# Patient Record
Sex: MALE | Race: OTHER | NOT HISPANIC OR LATINO | URBAN - METROPOLITAN AREA
[De-identification: names, ages, dates, MRNs, and addresses within clinical notes are randomized per-mention and may not be internally consistent; named-entity substitution may affect disease eponyms.]

---

## 2023-05-26 ENCOUNTER — EMERGENCY (EMERGENCY)
Facility: HOSPITAL | Age: 43
LOS: 1 days | Discharge: HOME | End: 2023-05-26
Attending: EMERGENCY MEDICINE
Payer: MEDICAID

## 2023-05-26 ENCOUNTER — APPOINTMENT (OUTPATIENT)
Dept: AFTER HOURS CARE | Facility: EMERGENCY ROOM | Age: 43
End: 2023-05-26
Payer: MEDICAID

## 2023-05-26 VITALS
SYSTOLIC BLOOD PRESSURE: 137 MMHG | HEIGHT: 65 IN | DIASTOLIC BLOOD PRESSURE: 94 MMHG | HEART RATE: 87 BPM | RESPIRATION RATE: 16 BRPM | OXYGEN SATURATION: 100 % | WEIGHT: 182.1 LBS | TEMPERATURE: 98 F

## 2023-05-26 VITALS
HEART RATE: 83 BPM | TEMPERATURE: 98 F | RESPIRATION RATE: 17 BRPM | DIASTOLIC BLOOD PRESSURE: 78 MMHG | OXYGEN SATURATION: 98 % | SYSTOLIC BLOOD PRESSURE: 114 MMHG

## 2023-05-26 PROBLEM — Z00.00 ENCOUNTER FOR PREVENTIVE HEALTH EXAMINATION: Status: ACTIVE | Noted: 2023-05-26

## 2023-05-26 LAB
ALBUMIN SERPL ELPH-MCNC: 3.8 G/DL — SIGNIFICANT CHANGE UP (ref 3.5–5)
ALP SERPL-CCNC: 106 U/L — SIGNIFICANT CHANGE UP (ref 40–120)
ALT FLD-CCNC: 47 U/L DA — SIGNIFICANT CHANGE UP (ref 10–60)
ANION GAP SERPL CALC-SCNC: 1 MMOL/L — LOW (ref 5–17)
APPEARANCE UR: CLEAR — SIGNIFICANT CHANGE UP
AST SERPL-CCNC: 29 U/L — SIGNIFICANT CHANGE UP (ref 10–40)
BASOPHILS # BLD AUTO: 0.03 K/UL — SIGNIFICANT CHANGE UP (ref 0–0.2)
BASOPHILS NFR BLD AUTO: 0.3 % — SIGNIFICANT CHANGE UP (ref 0–2)
BILIRUB SERPL-MCNC: 0.8 MG/DL — SIGNIFICANT CHANGE UP (ref 0.2–1.2)
BILIRUB UR-MCNC: NEGATIVE — SIGNIFICANT CHANGE UP
BUN SERPL-MCNC: 10 MG/DL — SIGNIFICANT CHANGE UP (ref 7–18)
CALCIUM SERPL-MCNC: 9.5 MG/DL — SIGNIFICANT CHANGE UP (ref 8.4–10.5)
CHLORIDE SERPL-SCNC: 107 MMOL/L — SIGNIFICANT CHANGE UP (ref 96–108)
CO2 SERPL-SCNC: 27 MMOL/L — SIGNIFICANT CHANGE UP (ref 22–31)
COLOR SPEC: YELLOW — SIGNIFICANT CHANGE UP
CREAT SERPL-MCNC: 0.6 MG/DL — SIGNIFICANT CHANGE UP (ref 0.5–1.3)
DIFF PNL FLD: NEGATIVE — SIGNIFICANT CHANGE UP
EGFR: 122 ML/MIN/1.73M2 — SIGNIFICANT CHANGE UP
EOSINOPHIL # BLD AUTO: 0.02 K/UL — SIGNIFICANT CHANGE UP (ref 0–0.5)
EOSINOPHIL NFR BLD AUTO: 0.2 % — SIGNIFICANT CHANGE UP (ref 0–6)
GLUCOSE SERPL-MCNC: 143 MG/DL — HIGH (ref 70–99)
GLUCOSE UR QL: NEGATIVE — SIGNIFICANT CHANGE UP
HCT VFR BLD CALC: 42.4 % — SIGNIFICANT CHANGE UP (ref 39–50)
HGB BLD-MCNC: 14.3 G/DL — SIGNIFICANT CHANGE UP (ref 13–17)
IMM GRANULOCYTES NFR BLD AUTO: 0.3 % — SIGNIFICANT CHANGE UP (ref 0–0.9)
KETONES UR-MCNC: ABNORMAL
LEUKOCYTE ESTERASE UR-ACNC: ABNORMAL
LYMPHOCYTES # BLD AUTO: 1.29 K/UL — SIGNIFICANT CHANGE UP (ref 1–3.3)
LYMPHOCYTES # BLD AUTO: 12.7 % — LOW (ref 13–44)
MAGNESIUM SERPL-MCNC: 2.1 MG/DL — SIGNIFICANT CHANGE UP (ref 1.6–2.6)
MCHC RBC-ENTMCNC: 29.5 PG — SIGNIFICANT CHANGE UP (ref 27–34)
MCHC RBC-ENTMCNC: 33.7 GM/DL — SIGNIFICANT CHANGE UP (ref 32–36)
MCV RBC AUTO: 87.4 FL — SIGNIFICANT CHANGE UP (ref 80–100)
MONOCYTES # BLD AUTO: 0.54 K/UL — SIGNIFICANT CHANGE UP (ref 0–0.9)
MONOCYTES NFR BLD AUTO: 5.3 % — SIGNIFICANT CHANGE UP (ref 2–14)
NEUTROPHILS # BLD AUTO: 8.23 K/UL — HIGH (ref 1.8–7.4)
NEUTROPHILS NFR BLD AUTO: 81.2 % — HIGH (ref 43–77)
NITRITE UR-MCNC: NEGATIVE — SIGNIFICANT CHANGE UP
NRBC # BLD: 0 /100 WBCS — SIGNIFICANT CHANGE UP (ref 0–0)
PH UR: 6.5 — SIGNIFICANT CHANGE UP (ref 5–8)
PLATELET # BLD AUTO: 214 K/UL — SIGNIFICANT CHANGE UP (ref 150–400)
POTASSIUM SERPL-MCNC: 4.3 MMOL/L — SIGNIFICANT CHANGE UP (ref 3.5–5.3)
POTASSIUM SERPL-SCNC: 4.3 MMOL/L — SIGNIFICANT CHANGE UP (ref 3.5–5.3)
PROT SERPL-MCNC: 7.8 G/DL — SIGNIFICANT CHANGE UP (ref 6–8.3)
PROT UR-MCNC: 15 MG/DL
RBC # BLD: 4.85 M/UL — SIGNIFICANT CHANGE UP (ref 4.2–5.8)
RBC # FLD: 12.8 % — SIGNIFICANT CHANGE UP (ref 10.3–14.5)
SODIUM SERPL-SCNC: 135 MMOL/L — SIGNIFICANT CHANGE UP (ref 135–145)
SP GR SPEC: 1 — LOW (ref 1.01–1.02)
TROPONIN I, HIGH SENSITIVITY RESULT: 3.9 NG/L — SIGNIFICANT CHANGE UP
UROBILINOGEN FLD QL: NEGATIVE — SIGNIFICANT CHANGE UP
WBC # BLD: 10.14 K/UL — SIGNIFICANT CHANGE UP (ref 3.8–10.5)
WBC # FLD AUTO: 10.14 K/UL — SIGNIFICANT CHANGE UP (ref 3.8–10.5)

## 2023-05-26 PROCEDURE — 85025 COMPLETE CBC W/AUTO DIFF WBC: CPT

## 2023-05-26 PROCEDURE — 80053 COMPREHEN METABOLIC PANEL: CPT

## 2023-05-26 PROCEDURE — 83735 ASSAY OF MAGNESIUM: CPT

## 2023-05-26 PROCEDURE — 84484 ASSAY OF TROPONIN QUANT: CPT

## 2023-05-26 PROCEDURE — 81001 URINALYSIS AUTO W/SCOPE: CPT

## 2023-05-26 PROCEDURE — 99285 EMERGENCY DEPT VISIT HI MDM: CPT | Mod: 25

## 2023-05-26 PROCEDURE — 36415 COLL VENOUS BLD VENIPUNCTURE: CPT

## 2023-05-26 PROCEDURE — 93005 ELECTROCARDIOGRAM TRACING: CPT

## 2023-05-26 PROCEDURE — 71046 X-RAY EXAM CHEST 2 VIEWS: CPT

## 2023-05-26 PROCEDURE — 99204 OFFICE O/P NEW MOD 45 MIN: CPT | Mod: 95

## 2023-05-26 PROCEDURE — 99285 EMERGENCY DEPT VISIT HI MDM: CPT

## 2023-05-26 PROCEDURE — 71046 X-RAY EXAM CHEST 2 VIEWS: CPT | Mod: 26

## 2023-05-26 RX ORDER — MECLIZINE HCL 12.5 MG
1 TABLET ORAL
Qty: 15 | Refills: 0
Start: 2023-05-26

## 2023-05-26 RX ORDER — SODIUM CHLORIDE 9 MG/ML
1000 INJECTION INTRAMUSCULAR; INTRAVENOUS; SUBCUTANEOUS ONCE
Refills: 0 | Status: COMPLETED | OUTPATIENT
Start: 2023-05-26 | End: 2023-05-26

## 2023-05-26 RX ORDER — MECLIZINE HCL 12.5 MG
25 TABLET ORAL ONCE
Refills: 0 | Status: COMPLETED | OUTPATIENT
Start: 2023-05-26 | End: 2023-05-26

## 2023-05-26 RX ADMIN — SODIUM CHLORIDE 1000 MILLILITER(S): 9 INJECTION INTRAMUSCULAR; INTRAVENOUS; SUBCUTANEOUS at 16:19

## 2023-05-26 RX ADMIN — Medication 25 MILLIGRAM(S): at 16:19

## 2023-05-26 NOTE — ED PROVIDER NOTE - PATIENT PORTAL LINK FT
You can access the FollowMyHealth Patient Portal offered by [facility] by registering at the following website: [email or web address]. By joining [facility] FollowMyHealth portal, you will also be able to view your health information using other applications (apps) compatible with our system.

## 2023-05-26 NOTE — ED ADULT NURSE NOTE - PRIMARY CARE PROVIDER
Pt's mother reports Pt did \"back to back clean out on Thursday\"  Stool softener, 64 oz gatorade and 16 scoops miralax Thursday night and then 11am Friday    Pt started passing stool 15 minutes after starting prep on Thursday, has continued to have loose stool with water  Eating applesauce, yogurt, saltines, honey nut cheerios  Last 24 hours has stooled 4 times, having hunger pains  Denies fever or chills, abdominal cramping    Advised to push fluids, bland diet    Pt's mom requesting school excuse for today  Please fax to [facility] at [phone]    Ok for school excuse?          unkn

## 2023-05-26 NOTE — ED PROVIDER NOTE - CLINICAL SUMMARY MEDICAL DECISION MAKING FREE TEXT BOX
41 yo M with palpitations - non specific  Also with vague dizziness - suspect peripheral vertigo as worse with walking  EKG - nsr, rate 61, QTc 420, no ischemic changes, no ectopy   labs non actionable  UA - possible UTI, but pt denies any urinary symptoms; I do not suspect a 41 yo M would present with dizziness in setting of UTI w/ this UA, will not treat at this time  Dc supportive care out pt fu  Discussed indications for patient return to ED. Patient understood. 41 yo M with palpitations - non specific  Also with vague dizziness - suspect peripheral vertigo as worse with walking  EKG - nsr, rate 83, Qtc 418, no ischemic changes, no ectopy   labs non actionable  UA - possible UTI, but pt denies any urinary symptoms; I do not suspect a 41 yo M would present with dizziness in setting of UTI w/ this UA, will not treat at this time  Dc supportive care out pt fu  Discussed indications for patient return to ED. Patient understood.

## 2023-05-26 NOTE — ED PROVIDER NOTE - NSFOLLOWUPCLINICS_GEN_ALL_ED_FT
[facility]  Cardiology  [address] [facility] [address], [address]  [place] [address]  Phone: [phone]  Fax:     [facility]  Neurology  [address] [place] [address]  Phone: [phone]  Fax: [phone]

## 2023-05-26 NOTE — ED PROVIDER NOTE - PHYSICAL EXAMINATION
GENERAL: well appearing, no acute distress   HEAD: atraumatic   EYES: EOMI   ENT: moist oral mucosa   CARDIAC: regular rate, ext warm well pefused  RESPIRATORY: no increased work of breathing, lungs clear   MUSCULOSKELETAL: no deformity   NEUROLOGICAL: AAOx3, CN's II-XII intact, strength 5/5 bilateral UE and LE, sensation intact to light touch, finger to nose intact, steady gait  SKIN: no visible rash  PSYCHIATRIC: cooperative

## 2023-05-26 NOTE — ED PROVIDER NOTE - OBJECTIVE STATEMENT
43 yo M pmh of DM  c/o head feeling funny x a few days, worse with walking, kind of feels dizzy  With episode of palpitations today  Denies other acute complaints 43 yo M pmh of DM  c/o head feeling funny x a few days, worse with walking, kind of feels dizzy  With episode of palpitations today  Denies other acute complaints  Speaks English

## 2023-05-26 NOTE — ED PROVIDER NOTE - PRO INTERPRETER NEED 2
[place]
Marked improvement today with retreat of reactive cellulitis in right upper thigh. There is residual tender induration with erythema at the site of previous attempted I&D. Patient describes tremendous recent stress and dietary indiscretion. Evaluation remarkable for obesity and poorly controlled new diabetes with Hgb A1C = 13.0.  Patient has Staph aureus (MRSA v MSSA)  purulent cellulitis gradually responding to antibiotics.    Suggest;  Endocrine evaluation/glycemic control  D/C Zosyn  Ancef/Vanco  If sustained improvement: Resume Bactrim DS one tab BID for additional 7 days.

## 2023-05-26 NOTE — ED ADULT NURSE NOTE - NSFALLUNIVINTERV_ED_ALL_ED
Bed/Stretcher in lowest position, wheels locked, appropriate side rails in place/Call bell, personal items and telephone in reach/Instruct patient to call for assistance before getting out of bed/chair/stretcher/Non-slip footwear applied when patient is off stretcher/[place] to call system/Physically safe environment - no spills, clutter or unnecessary equipment/Purposeful proactive rounding/Room/bathroom lighting operational, light cord in reach

## 2023-06-02 ENCOUNTER — INPATIENT (INPATIENT)
Facility: HOSPITAL | Age: 43
LOS: 1 days | Discharge: HOME | DRG: 310 | End: 2023-06-04
Attending: STUDENT IN AN ORGANIZED HEALTH CARE EDUCATION/TRAINING PROGRAM | Admitting: STUDENT IN AN ORGANIZED HEALTH CARE EDUCATION/TRAINING PROGRAM
Payer: MEDICAID

## 2023-06-02 VITALS
RESPIRATION RATE: 19 BRPM | HEART RATE: 90 BPM | TEMPERATURE: 99 F | WEIGHT: 179.9 LBS | OXYGEN SATURATION: 95 % | HEIGHT: 65 IN | SYSTOLIC BLOOD PRESSURE: 144 MMHG | DIASTOLIC BLOOD PRESSURE: 81 MMHG

## 2023-06-02 DIAGNOSIS — E11.9 TYPE 2 DIABETES MELLITUS WITHOUT COMPLICATIONS: ICD-10-CM

## 2023-06-02 DIAGNOSIS — R42 DIZZINESS AND GIDDINESS: ICD-10-CM

## 2023-06-02 DIAGNOSIS — R00.2 PALPITATIONS: ICD-10-CM

## 2023-06-02 DIAGNOSIS — Z29.9 ENCOUNTER FOR PROPHYLACTIC MEASURES, UNSPECIFIED: ICD-10-CM

## 2023-06-02 DIAGNOSIS — R55 SYNCOPE AND COLLAPSE: ICD-10-CM

## 2023-06-02 LAB
ALBUMIN SERPL ELPH-MCNC: 3.9 G/DL — SIGNIFICANT CHANGE UP (ref 3.5–5)
ALP SERPL-CCNC: 102 U/L — SIGNIFICANT CHANGE UP (ref 40–120)
ALT FLD-CCNC: 52 U/L DA — SIGNIFICANT CHANGE UP (ref 10–60)
ANION GAP SERPL CALC-SCNC: 5 MMOL/L — SIGNIFICANT CHANGE UP (ref 5–17)
AST SERPL-CCNC: 18 U/L — SIGNIFICANT CHANGE UP (ref 10–40)
BASOPHILS # BLD AUTO: 0.04 K/UL — SIGNIFICANT CHANGE UP (ref 0–0.2)
BASOPHILS NFR BLD AUTO: 0.5 % — SIGNIFICANT CHANGE UP (ref 0–2)
BILIRUB SERPL-MCNC: 0.7 MG/DL — SIGNIFICANT CHANGE UP (ref 0.2–1.2)
BUN SERPL-MCNC: 7 MG/DL — SIGNIFICANT CHANGE UP (ref 7–18)
CALCIUM SERPL-MCNC: 9.2 MG/DL — SIGNIFICANT CHANGE UP (ref 8.4–10.5)
CHLORIDE SERPL-SCNC: 107 MMOL/L — SIGNIFICANT CHANGE UP (ref 96–108)
CO2 SERPL-SCNC: 26 MMOL/L — SIGNIFICANT CHANGE UP (ref 22–31)
CREAT SERPL-MCNC: 0.51 MG/DL — SIGNIFICANT CHANGE UP (ref 0.5–1.3)
EGFR: 128 ML/MIN/1.73M2 — SIGNIFICANT CHANGE UP
EOSINOPHIL # BLD AUTO: 0.05 K/UL — SIGNIFICANT CHANGE UP (ref 0–0.5)
EOSINOPHIL NFR BLD AUTO: 0.6 % — SIGNIFICANT CHANGE UP (ref 0–6)
GLUCOSE BLDC GLUCOMTR-MCNC: 109 MG/DL — HIGH (ref 70–99)
GLUCOSE SERPL-MCNC: 116 MG/DL — HIGH (ref 70–99)
HCT VFR BLD CALC: 40.9 % — SIGNIFICANT CHANGE UP (ref 39–50)
HGB BLD-MCNC: 14 G/DL — SIGNIFICANT CHANGE UP (ref 13–17)
IMM GRANULOCYTES NFR BLD AUTO: 0.3 % — SIGNIFICANT CHANGE UP (ref 0–0.9)
LYMPHOCYTES # BLD AUTO: 1.93 K/UL — SIGNIFICANT CHANGE UP (ref 1–3.3)
LYMPHOCYTES # BLD AUTO: 24.9 % — SIGNIFICANT CHANGE UP (ref 13–44)
MCHC RBC-ENTMCNC: 30.3 PG — SIGNIFICANT CHANGE UP (ref 27–34)
MCHC RBC-ENTMCNC: 34.2 GM/DL — SIGNIFICANT CHANGE UP (ref 32–36)
MCV RBC AUTO: 88.5 FL — SIGNIFICANT CHANGE UP (ref 80–100)
MONOCYTES # BLD AUTO: 0.54 K/UL — SIGNIFICANT CHANGE UP (ref 0–0.9)
MONOCYTES NFR BLD AUTO: 7 % — SIGNIFICANT CHANGE UP (ref 2–14)
NEUTROPHILS # BLD AUTO: 5.17 K/UL — SIGNIFICANT CHANGE UP (ref 1.8–7.4)
NEUTROPHILS NFR BLD AUTO: 66.7 % — SIGNIFICANT CHANGE UP (ref 43–77)
NRBC # BLD: 0 /100 WBCS — SIGNIFICANT CHANGE UP (ref 0–0)
PLATELET # BLD AUTO: 202 K/UL — SIGNIFICANT CHANGE UP (ref 150–400)
POTASSIUM SERPL-MCNC: 3.6 MMOL/L — SIGNIFICANT CHANGE UP (ref 3.5–5.3)
POTASSIUM SERPL-SCNC: 3.6 MMOL/L — SIGNIFICANT CHANGE UP (ref 3.5–5.3)
PROT SERPL-MCNC: 8 G/DL — SIGNIFICANT CHANGE UP (ref 6–8.3)
RBC # BLD: 4.62 M/UL — SIGNIFICANT CHANGE UP (ref 4.2–5.8)
RBC # FLD: 12.8 % — SIGNIFICANT CHANGE UP (ref 10.3–14.5)
SODIUM SERPL-SCNC: 138 MMOL/L — SIGNIFICANT CHANGE UP (ref 135–145)
TROPONIN I, HIGH SENSITIVITY RESULT: 4.5 NG/L — SIGNIFICANT CHANGE UP
WBC # BLD: 7.75 K/UL — SIGNIFICANT CHANGE UP (ref 3.8–10.5)
WBC # FLD AUTO: 7.75 K/UL — SIGNIFICANT CHANGE UP (ref 3.8–10.5)

## 2023-06-02 PROCEDURE — 99222 1ST HOSP IP/OBS MODERATE 55: CPT | Mod: GC

## 2023-06-02 PROCEDURE — 70450 CT HEAD/BRAIN W/O DYE: CPT | Mod: 26

## 2023-06-02 PROCEDURE — 71046 X-RAY EXAM CHEST 2 VIEWS: CPT | Mod: 26

## 2023-06-02 PROCEDURE — 99285 EMERGENCY DEPT VISIT HI MDM: CPT

## 2023-06-02 RX ORDER — INSULIN LISPRO 100/ML
VIAL (ML) SUBCUTANEOUS
Refills: 0 | Status: DISCONTINUED | OUTPATIENT
Start: 2023-06-02 | End: 2023-06-04

## 2023-06-02 RX ORDER — METFORMIN HYDROCHLORIDE 850 MG/1
1 TABLET ORAL
Refills: 0 | DISCHARGE

## 2023-06-02 RX ORDER — ATORVASTATIN CALCIUM 80 MG/1
10 TABLET, FILM COATED ORAL AT BEDTIME
Refills: 0 | Status: DISCONTINUED | OUTPATIENT
Start: 2023-06-02 | End: 2023-06-04

## 2023-06-02 RX ORDER — ENOXAPARIN SODIUM 100 MG/ML
40 INJECTION SUBCUTANEOUS EVERY 24 HOURS
Refills: 0 | Status: DISCONTINUED | OUTPATIENT
Start: 2023-06-02 | End: 2023-06-04

## 2023-06-02 RX ORDER — ATORVASTATIN CALCIUM 80 MG/1
1 TABLET, FILM COATED ORAL
Refills: 0 | DISCHARGE

## 2023-06-02 RX ORDER — MECLIZINE HCL 12.5 MG
25 TABLET ORAL THREE TIMES A DAY
Refills: 0 | Status: DISCONTINUED | OUTPATIENT
Start: 2023-06-02 | End: 2023-06-04

## 2023-06-02 RX ORDER — INSULIN LISPRO 100/ML
VIAL (ML) SUBCUTANEOUS AT BEDTIME
Refills: 0 | Status: DISCONTINUED | OUTPATIENT
Start: 2023-06-02 | End: 2023-06-04

## 2023-06-02 RX ORDER — SODIUM CHLORIDE 9 MG/ML
1000 INJECTION INTRAMUSCULAR; INTRAVENOUS; SUBCUTANEOUS ONCE
Refills: 0 | Status: COMPLETED | OUTPATIENT
Start: 2023-06-02 | End: 2023-06-02

## 2023-06-02 RX ADMIN — SODIUM CHLORIDE 1000 MILLILITER(S): 9 INJECTION INTRAMUSCULAR; INTRAVENOUS; SUBCUTANEOUS at 16:16

## 2023-06-02 RX ADMIN — ATORVASTATIN CALCIUM 10 MILLIGRAM(S): 80 TABLET, FILM COATED ORAL at 22:36

## 2023-06-02 NOTE — ED PROVIDER NOTE - OBJECTIVE STATEMENT
42-year-old man with a past medical history of diabetes also recently diagnosed with peripheral vertigo presenting for episode of feeling lightheaded with palpitations while ambulating earlier today.  He reports he slept late today and left the house without getting breakfast and on the way back from the [facility] he started feeling lightheaded like the room was closing in with a heart racing so he sat down.  He was able to get himself home.  He was denying any chest pains associated.  He is currently reporting feeling well.  He has not eaten or drank anything yet today.

## 2023-06-02 NOTE — H&P ADULT - PROBLEM SELECTOR PLAN 3
hx of dizziness/ lightheadedness intermittently, recently discharged from ED on Meclizine not well improved  CT head neg for acute findings  Neuro physical exam unremarkable  Peripheral vertigo [more likely] vs Central [unlikely]  Meclizine PRN hx of dizziness/ lightheadedness intermittently, recently discharged from ED on Meclizine not well improved  CT head neg for acute findings  Neuro physical exam unremarkable  Peripheral vertigo [more likely] vs Central [unlikely]  Meclizine PRN  f/u PT

## 2023-06-02 NOTE — ED PROVIDER NOTE - CADM POA PRESS ULCER
Remains in droplet-isolation. Denies cough, sob. Up to bathroom with walker, minimal assist.  Denied chest pain.         Problem: Safety - Adult  Goal: Free from fall injury  Outcome: Progressing     Problem: ABCDS Injury Assessment  Goal: Absence of physical injury  Outcome: Progressing No

## 2023-06-02 NOTE — PLAN
[By Private Vehicle] : Sent to Emergency Department by private vehicle [FreeTextEntry1] : go to ER for evaluation and management

## 2023-06-02 NOTE — ED ADULT NURSE NOTE - NSFALLUNIVINTERV_ED_ALL_ED
Bed/Stretcher in lowest position, wheels locked, appropriate side rails in place/Call bell, personal items and telephone in reach/Instruct patient to call for assistance before getting out of bed/chair/stretcher/Non-slip footwear applied when patient is off stretcher/[place] to call system/Physically safe environment - no spills, clutter or unnecessary equipment/Purposeful proactive rounding/Room/bathroom lighting operational, light cord in reach

## 2023-06-02 NOTE — ED PROVIDER NOTE - NS ED MD DISPO DIVISION
LABS:                        13.8   7.77  )-----------( 195      ( 14 Apr 2023 23:17 )             40.7     Hb Trend: 13.8<--  WBC Trend: 7.77<--  Plt Trend: 195<--          04-14    136  |  96<L>  |  15  ----------------------------<  103<H>  3.7   |  31  |  1.4    Ca    9.9      14 Apr 2023 23:17    TPro  7.2  /  Alb  4.4  /  TBili  0.5  /  DBili  x   /  AST  15  /  ALT  11  /  AlkPhos  83  04-14    Troponin T, Serum: 0.04 ng/mL *HH* (04-15-23 @ 00:37)        IMAGING:  reviewed [facility]

## 2023-06-02 NOTE — HISTORY OF PRESENT ILLNESS
[Home] : at home, [unfilled] , at the time of the visit. [Other Location: e.g. Home (Enter Location, City,State)___] : at [unfilled] [Verbal consent obtained from patient] : the patient, [unfilled] [FreeTextEntry8] : 40 yo male with PMH DM for evaluation of hospital evaluation of palpitations, dizziness, lightheadedness. Reports he was diagnosed with vertigo took Meclizine with improvement but now with worsened symptoms. States he feels unwell.

## 2023-06-02 NOTE — H&P ADULT - HISTORY OF PRESENT ILLNESS
42 yrs old M w/ pmhx of DM dx on 2021, presented with palpitation and dizziness. Pt reported that he taking a walk about 10 minutes were he felt palpitation [regular rhythm], with dizziness/lightheadedness, this was associated with blurry vision, pressure on the top of the head. Pt has been having 2 other episodes of dizziness in the past week, where he visited the ED once and was discharged on Meclizine.   Pt denied any chest pain, sweating, shortness of breath, abdominal pain, N/V/D/constipation, urinary symptoms, lower extremity swelling, weakness or decreased sensation in the arms or legs, runny nose, cough, ringing in the ears.   Pt noticed intermittent Rt ear pain, without any discharges. He denied any recent travel hx, hx of sick contacts. Pt had 2 days of cold/flu about 2 months ago.   Family hx is positive for DM in mother and father. Pt denied any family hx of CAD, HLD, stroke, sudden cardiac death.

## 2023-06-02 NOTE — H&P ADULT - ATTENDING COMMENTS
42M with PMHx of T2DM presenting for one week history of lightheadedness/dizziness with exertion. Patient recently here one week prior and d/c with diagnosis of peripheral vertigo. He tells me he started noticing when he ambulates he would begin to feel lightheaded and weak with palpitations. Denies SOB or chest pain. No syncope. He denies vertigo. He states once he rests the symptoms improve. Denies this is related to position changes. In fact, may also feel palpitations when just sitting. Denies excess stress or anxiety. Also states that he has a CGM and has not been hypoglycemic.    VSS  Brief neuro exam unremarkable, non-focal, no dysmetria, no pronator drift    Labs personally reviewed    CT head unremarkable.  EKG personally reviewed by me: [id number]    A/P:  Very vague and non-specific constellation of symptoms. Currently doubt neuro etiology. Monitor on telemetry and perform TTE. Cardiology consulted. May benefit from ischemic work up? PT consult to ambulate patient. FS TID AC & insulin sliding scale. Monitor patient for now. Further work up will be pending clinical course.

## 2023-06-02 NOTE — ED PROVIDER NOTE - PROGRESS NOTE DETAILS
Discussed with patient given progressive symptoms on exertion will admit for further risk stratification, discussed with Dr Garcia who will accept admission.

## 2023-06-02 NOTE — H&P ADULT - PROBLEM SELECTOR PLAN 1
no p/w palpitation, lightheadedness, physical exam: Loud S1,S2, ?,murmur in the apex   rule out ACS   CT head - noted : neg for acute findings  EKG: NSR  Trop x2 neg  Pro-BNP neg  c/w tele  f/u Echo  Cardio Dr. Henderson consulted p/w palpitation, lightheadedness, physical exam: Loud S1,S2, ?,murmur in the apex   rule out ACS   CT head - noted : neg for acute findings  EKG: NSR  Trop x2 neg  Pro-BNP neg  c/w tele  f/u Echo  f/u lipid panel    Cardio Dr. Henderson consulted

## 2023-06-02 NOTE — ED ADULT NURSE NOTE - OBJECTIVE STATEMENT
Patient present to the ED A&OX3 c/o vertigo and palpitations. Patient stated symptoms started this morning. Patient denies pain, discomfort and SOB. Patient overhead cardiac monitor active. Sinus Rhythm with PVA noted. Patient is awake and calm on bed. No signs of acute distress.

## 2023-06-02 NOTE — H&P ADULT - NSHPREVIEWOFSYSTEMS_GEN_ALL_CORE
REVIEW OF SYSTEMS:    CONSTITUTIONAL: No weakness, fevers or chills  EYES/ENT: No visual changes;  + vertigo [room spinning], no  throat pain   NECK: No pain or stiffness  RESPIRATORY: No cough, wheezing, hemoptysis; No shortness of breath  CARDIOVASCULAR: No chest pain + palpitations  GASTROINTESTINAL: No abdominal or epigastric pain. No nausea, vomiting, or hematemesis; No diarrhea or constipation. No melena or hematochezia.  GENITOURINARY: No dysuria, frequency or hematuria  NEUROLOGICAL: No numbness or weakness  SKIN: No itching, rashes

## 2023-06-02 NOTE — H&P ADULT - NSHPLABSRESULTS_GEN_ALL_CORE
ACC: [id number] EXAM:  CT BRAIN   ORDERED BY: WAYNE JOE     PROCEDURE DATE:  06/02/2023          INTERPRETATION:  CLINICAL INDICATION:  Dizziness. Headache.    TECHNIQUE: Noncontrast CT examination of the head was performed. Coronal   and sagittal  reformats were also obtained.    COMPARISON: There are no prior studies available for comparison.    FINDINGS:    There is no evidence of mass or acute intracranial hemorrhage. Ventricles   and sulci are normal in size and configuration for the patient's stated   age. No midline shift or other significant mass effect is noted. There is   no CT evidence of acute territorial infarct.    Mild mucosal thickening of the paranasal sinuses. The tympanomastoid   spaces are clear. Orbits and orbital contents are unremarkable.    There is no depressed calvarial fracture.        IMPRESSION:    No evidence of acute intracranial hemorrhage, midline shift or CT   evidence of acute territorial infarct.    If the patient's symptoms persist, consider short interval follow-up head   CT or brain MRI if there are no MRI contraindications.    --- End of Report ---            LOS MOON MD; Attending Radiologist  This document has been electronically signed. Jun 2 2023  7:03PM

## 2023-06-02 NOTE — H&P ADULT - ASSESSMENT
42 yrs old M w/ pmhx of DM dx on 2021, presented with palpitation and dizziness. Pt is admitted for rule out ACS.

## 2023-06-02 NOTE — ED PROVIDER NOTE - CLINICAL SUMMARY MEDICAL DECISION MAKING FREE TEXT BOX
Patient presenting with exertional near syncope and palpitations.  Did not eat or drink today so could be related to dehydration.  However given age and medical history will obtain labs EKG and troponin to screen for ACS.  We will administer IV fluids in the emergency room.  He continues to be symptomatic with exertion will admit for further evaluation of possible cardiac disease/risk stratification.

## 2023-06-02 NOTE — H&P ADULT - NSHPSOCIALHISTORY_GEN_ALL_CORE
Lives with family  ambulates without assistance  Denied alcohol consumption, smoking, or use of illicit drugs

## 2023-06-02 NOTE — ED ADULT TRIAGE NOTE - CHIEF COMPLAINT QUOTE
c/o dizziness, palpations and almost passed out , recently diagnosed with vertigo, missed dose of medication

## 2023-06-02 NOTE — H&P ADULT - NSHPPHYSICALEXAM_GEN_ALL_CORE
GENERAL: NAD, sitting in bed comfortably  HEAD:  Atraumatic, Normocephalic  EYES: EOMI, PERRLA, conjunctiva and sclera clear  ENT: Moist mucous membranes  NECK: Supple, No JVD  CHEST/LUNG: Clear to auscultation bilaterally; No rales, rhonchi, wheezing, or rubs. Unlabored respirations  HEART: increased rate and normal rhythm; Loud S1 and S2, ? systolic murmur over the apex, No rubs, or gallops  ABDOMEN: Bowel sounds present; Soft, Nontender, Nondistended. mildly obese abdomen   EXTREMITIES:  2+ Peripheral Pulses, brisk capillary refill. No clubbing, cyanosis, or edema  NERVOUS SYSTEM:  Alert & Oriented X3, speech clear. No sensory or motor deficit, 5/5 strength in upper and LE, normal CN II-XII, normal finger to nose, hill to shin and no dysdiadochokinesias   MSK: FROM all 4 extremities, full and equal strength  SKIN: No rashes or lesions Vital Signs Last 24 Hrs  T(C): 36.7 (02 Jun 2023 19:17), Max: 37.1 (02 Jun 2023 14:57)  T(F): 98 (02 Jun 2023 19:17), Max: 98.7 (02 Jun 2023 14:57)  HR: 101 (02 Jun 2023 19:17) (90 - 106)  BP: 113/85 (02 Jun 2023 19:17) (113/85 - 144/81)  BP(mean): --  RR: 13 (02 Jun 2023 19:17) (13 - 19)  SpO2: 97% (02 Jun 2023 19:17) (95% - 99%)    Parameters below as of 02 Jun 2023 19:17  Patient On (Oxygen Delivery Method): room air        GENERAL: NAD, sitting in bed comfortably  HEAD:  Atraumatic, Normocephalic  EYES: EOMI, PERRLA, conjunctiva and sclera clear  ENT: Moist mucous membranes  NECK: Supple, No JVD  CHEST/LUNG: Clear to auscultation bilaterally; No rales, rhonchi, wheezing, or rubs. Unlabored respirations  HEART: increased rate and normal rhythm; Loud S1 and S2, ? systolic murmur over the apex, No rubs, or gallops  ABDOMEN: Bowel sounds present; Soft, Nontender, Nondistended. mildly obese abdomen   EXTREMITIES:  2+ Peripheral Pulses, brisk capillary refill. No clubbing, cyanosis, or edema  NERVOUS SYSTEM:  Alert & Oriented X3, speech clear. No sensory or motor deficit, 5/5 strength in upper and LE, normal CN II-XII, normal finger to nose, hill to shin and no dysdiadochokinesias   MSK: FROM all 4 extremities, full and equal strength  SKIN: No rashes or lesions

## 2023-06-03 ENCOUNTER — TRANSCRIPTION ENCOUNTER (OUTPATIENT)
Age: 43
End: 2023-06-03

## 2023-06-03 LAB
A1C WITH ESTIMATED AVERAGE GLUCOSE RESULT: 7 % — HIGH (ref 4–5.6)
ALBUMIN SERPL ELPH-MCNC: 3.7 G/DL — SIGNIFICANT CHANGE UP (ref 3.5–5)
ALP SERPL-CCNC: 94 U/L — SIGNIFICANT CHANGE UP (ref 40–120)
ALT FLD-CCNC: 47 U/L DA — SIGNIFICANT CHANGE UP (ref 10–60)
ANION GAP SERPL CALC-SCNC: 3 MMOL/L — LOW (ref 5–17)
AST SERPL-CCNC: 15 U/L — SIGNIFICANT CHANGE UP (ref 10–40)
BILIRUB SERPL-MCNC: 0.6 MG/DL — SIGNIFICANT CHANGE UP (ref 0.2–1.2)
BUN SERPL-MCNC: 8 MG/DL — SIGNIFICANT CHANGE UP (ref 7–18)
CALCIUM SERPL-MCNC: 9.4 MG/DL — SIGNIFICANT CHANGE UP (ref 8.4–10.5)
CHLORIDE SERPL-SCNC: 110 MMOL/L — HIGH (ref 96–108)
CHOLEST SERPL-MCNC: 126 MG/DL — SIGNIFICANT CHANGE UP
CO2 SERPL-SCNC: 25 MMOL/L — SIGNIFICANT CHANGE UP (ref 22–31)
CREAT SERPL-MCNC: 0.5 MG/DL — SIGNIFICANT CHANGE UP (ref 0.5–1.3)
EGFR: 129 ML/MIN/1.73M2 — SIGNIFICANT CHANGE UP
ESTIMATED AVERAGE GLUCOSE: 154 MG/DL — HIGH (ref 68–114)
GLUCOSE BLDC GLUCOMTR-MCNC: 100 MG/DL — HIGH (ref 70–99)
GLUCOSE BLDC GLUCOMTR-MCNC: 109 MG/DL — HIGH (ref 70–99)
GLUCOSE BLDC GLUCOMTR-MCNC: 121 MG/DL — HIGH (ref 70–99)
GLUCOSE BLDC GLUCOMTR-MCNC: 129 MG/DL — HIGH (ref 70–99)
GLUCOSE SERPL-MCNC: 112 MG/DL — HIGH (ref 70–99)
HCT VFR BLD CALC: 40.4 % — SIGNIFICANT CHANGE UP (ref 39–50)
HDLC SERPL-MCNC: 34 MG/DL — LOW
HGB BLD-MCNC: 13.5 G/DL — SIGNIFICANT CHANGE UP (ref 13–17)
LIPID PNL WITH DIRECT LDL SERPL: 70 MG/DL — SIGNIFICANT CHANGE UP
MAGNESIUM SERPL-MCNC: 2.3 MG/DL — SIGNIFICANT CHANGE UP (ref 1.6–2.6)
MCHC RBC-ENTMCNC: 29.7 PG — SIGNIFICANT CHANGE UP (ref 27–34)
MCHC RBC-ENTMCNC: 33.4 GM/DL — SIGNIFICANT CHANGE UP (ref 32–36)
MCV RBC AUTO: 89 FL — SIGNIFICANT CHANGE UP (ref 80–100)
NON HDL CHOLESTEROL: 92 MG/DL — SIGNIFICANT CHANGE UP
NRBC # BLD: 0 /100 WBCS — SIGNIFICANT CHANGE UP (ref 0–0)
PHOSPHATE SERPL-MCNC: 3.2 MG/DL — SIGNIFICANT CHANGE UP (ref 2.5–4.5)
PLATELET # BLD AUTO: 197 K/UL — SIGNIFICANT CHANGE UP (ref 150–400)
POTASSIUM SERPL-MCNC: 3.8 MMOL/L — SIGNIFICANT CHANGE UP (ref 3.5–5.3)
POTASSIUM SERPL-SCNC: 3.8 MMOL/L — SIGNIFICANT CHANGE UP (ref 3.5–5.3)
PROT SERPL-MCNC: 7.3 G/DL — SIGNIFICANT CHANGE UP (ref 6–8.3)
RBC # BLD: 4.54 M/UL — SIGNIFICANT CHANGE UP (ref 4.2–5.8)
RBC # FLD: 13 % — SIGNIFICANT CHANGE UP (ref 10.3–14.5)
SODIUM SERPL-SCNC: 138 MMOL/L — SIGNIFICANT CHANGE UP (ref 135–145)
TRIGL SERPL-MCNC: 108 MG/DL — SIGNIFICANT CHANGE UP
WBC # BLD: 7.26 K/UL — SIGNIFICANT CHANGE UP (ref 3.8–10.5)
WBC # FLD AUTO: 7.26 K/UL — SIGNIFICANT CHANGE UP (ref 3.8–10.5)

## 2023-06-03 PROCEDURE — 99233 SBSQ HOSP IP/OBS HIGH 50: CPT

## 2023-06-03 RX ORDER — ASPIRIN/CALCIUM CARB/MAGNESIUM 324 MG
81 TABLET ORAL DAILY
Refills: 0 | Status: DISCONTINUED | OUTPATIENT
Start: 2023-06-03 | End: 2023-06-04

## 2023-06-03 RX ADMIN — Medication 81 MILLIGRAM(S): at 12:55

## 2023-06-03 RX ADMIN — ATORVASTATIN CALCIUM 10 MILLIGRAM(S): 80 TABLET, FILM COATED ORAL at 21:34

## 2023-06-03 NOTE — DISCHARGE NOTE PROVIDER - NSDCCPCAREPLAN_GEN_ALL_CORE_FT
PRINCIPAL DISCHARGE DIAGNOSIS  Diagnosis: Near syncope  Assessment and Plan of Treatment: You were admitted to the hospital after you had an episode of near syncope, associated with lightheadedness and dizziness, blurry vision, and pressur kathleen top of your head. You had previous episode of dizziness and were discharge don meclizine. You were admitted to the hospital for further workup. CT head was negative for acute findings, EKG was normal, troponins were negative. You were monitored on telemetry, and you had a few intermittent irregular heartbeats. Cardiology Dr Henderson was consulted.   Echocardiogram of your heart revealed ______________. Your thyriod stimulating hormone was also within normal limits.   You are being sent home with aspirin 81 mg to take daily as a prophylactic measure. Chew 1 aspirin every morning.   You stated that you have an appointment with your Cardiologist on Tuesday, 6/6/2023. You must keep this appointment.        SECONDARY DISCHARGE DIAGNOSES  Diagnosis: Palpitation  Assessment and Plan of Treatment: see above    Diagnosis: Dizziness  Assessment and Plan of Treatment: see above  You underwent workup for dizziness. CT head was negative. Cardiology workup is described above.   Please take meclizine as needed for dizziness. Be aware that meclizine can make you sleepy so be careful with driving or opearting heavy machinery.  Please follow up with [facility] cardiologist    Diagnosis: DM (diabetes mellitus)  Assessment and Plan of Treatment: You have diabets and take metformin. Your A1c was 7.0, which means your estimated average glucose over the past 3 months is 154. Your oral metformin was held while you were in the hospital and you were given insulin instaed. On discharge please continue to take your regular metformin.  Follow up outpatient with your primary care doctor.    Diagnosis: Hyperlipidemia  Assessment and Plan of Treatment: You have hyperlipidemia and take statin. Blood tests showed that your lipid levels were within acceptable range. Continue to take statin nightly as prescribed by your doctor.     PRINCIPAL DISCHARGE DIAGNOSIS  Diagnosis: Near syncope  Assessment and Plan of Treatment: You were admitted to the hospital after you had an episode of near syncope, associated with lightheadedness and dizziness, blurry vision, and pressur kathleen top of your head. You had previous episode of dizziness and were discharge don meclizine. You were admitted to the hospital for further workup. CT head was negative for acute findings, EKG was normal, troponins were negative. You were monitored on telemetry, and you had a few intermittent irregular heartbeats. Cardiology Dr Henderson was consulted.   Echocardiogram of your heart revealed normal findings. Your thyriod stimulating hormone was also within normal limits.   You are being sent home with aspirin 81 mg to take daily as a prophylactic measure. Chew 1 aspirin every morning.   You stated that you have an appointment with your Cardiologist on Tuesday, 6/6/2023. You must keep this appointment.        SECONDARY DISCHARGE DIAGNOSES  Diagnosis: DM (diabetes mellitus)  Assessment and Plan of Treatment: You have diabets and take metformin. Your A1c was 7.0, which means your estimated average glucose over the past 3 months is 154. Your oral metformin was held while you were in the hospital and you were given insulin instaed. On discharge please continue to take your regular metformin.  Follow up outpatient with your primary care doctor.    Diagnosis: Palpitation  Assessment and Plan of Treatment: see above    Diagnosis: Hyperlipidemia  Assessment and Plan of Treatment: You have hyperlipidemia and take statin. Blood tests showed that your lipid levels were within acceptable range. Continue to take statin nightly as prescribed by your doctor.    Diagnosis: Dizziness  Assessment and Plan of Treatment: see above  You underwent workup for dizziness. CT head was negative. Cardiology workup is described above.   Please take meclizine as needed for dizziness. Be aware that meclizine can make you sleepy so be careful with driving or opearting heavy machinery.  Please follow up with [facility] cardiologist

## 2023-06-03 NOTE — PROGRESS NOTE ADULT - SUBJECTIVE AND OBJECTIVE BOX
Patient is a 42y old  Male who presents with a chief complaint of Palpitation and dizziness (03 Jun 2023 07:29)      SUBJECTIVE / OVERNIGHT EVENTS: Patient seen and examined at bedside. No acute events overnight. Denies CP/palpitations/SOB/dizziness/vertigo. Didn't try walking yet. Feels alright otherwise.    MEDICATIONS  (STANDING):  atorvastatin 10 milliGRAM(s) Oral at bedtime  enoxaparin Injectable 40 milliGRAM(s) SubCutaneous every 24 hours  insulin lispro (ADMELOG) corrective regimen sliding scale   SubCutaneous at bedtime  insulin lispro (ADMELOG) corrective regimen sliding scale   SubCutaneous three times a day before meals    MEDICATIONS  (PRN):  meclizine 25 milliGRAM(s) Oral three times a day PRN for dizziness      CAPILLARY BLOOD GLUCOSE      POCT Blood Glucose.: 109 mg/dL (03 Jun 2023 07:39)  POCT Blood Glucose.: 109 mg/dL (02 Jun 2023 22:36)  POCT Blood Glucose.: 107 mg/dL (02 Jun 2023 15:32)    I&O's Summary    03 Jun 2023 07:01  -  03 Jun 2023 10:57  --------------------------------------------------------  IN: 118 mL / OUT: 0 mL / NET: 118 mL        PHYSICAL EXAM:  Vital Signs Last 24 Hrs  T(C): 36.1 (03 Jun 2023 07:10), Max: 37.1 (02 Jun 2023 14:57)  T(F): 97 (03 Jun 2023 07:10), Max: 98.7 (02 Jun 2023 14:57)  HR: 61 (03 Jun 2023 07:10) (61 - 106)  BP: 100/82 (03 Jun 2023 07:10) (100/82 - 144/81)  BP(mean): --  RR: 19 (03 Jun 2023 07:10) (13 - 20)  SpO2: 97% (03 Jun 2023 07:10) (95% - 100%)    Parameters below as of 03 Jun 2023 07:10  Patient On (Oxygen Delivery Method): room air    GEN: male in NAD, appears comfortable, no diaphoresis  EYES: No scleral injection, PERRL, EOMI  ENTM: neck supple & symmetric without tracheal deviation, moist membranes, no gross hearing impairment, thyroid gland not enlarged  CV: +S1/S2, no m/r/g, no abdominal bruit, no LE edema  RESP: breathing comfortably, no respiratory accessory muscle use, CTAB, no w/r/r  GI: normoactive BS, soft, NTND, no rebounding/guarding, no palpable masses    LABS:                        13.5   7.26  )-----------( 197      ( 03 Jun 2023 06:25 )             40.4     06-03    138  |  110<H>  |  8   ----------------------------<  112<H>  3.8   |  25  |  0.50    Ca    9.4      03 Jun 2023 06:25  Phos  3.2     06-03  Mg     2.3     06-03    TPro  7.3  /  Alb  3.7  /  TBili  0.6  /  DBili  x   /  AST  15  /  ALT  47  /  AlkPhos  94  06-03                RADIOLOGY & ADDITIONAL TESTS:  Results Reviewed:   Imaging Personally Reviewed:  Electrocardiogram Personally Reviewed:    COORDINATION OF CARE:  Care Discussed with Consultants/Other Providers [Y/N]:  Prior or Outpatient Records Reviewed [Y/N]:

## 2023-06-03 NOTE — CONSULT NOTE ADULT - ASSESSMENT
42 yrs old M w/ pmhx of DM dx on 2021, presented with palpitation and dizziness.  1.Tele monitoring.  2.Orthostatic bp.  3.Echocardiogram.  4.DM-Insulin,check a1c.  5.Add asa 81mg qd.Cont low dose statin.  6.Check TSH and vit b12.  7.GI and DVT prophylaxis.

## 2023-06-03 NOTE — CONSULT NOTE ADULT - SUBJECTIVE AND OBJECTIVE BOX
CHIEF COMPLAINT:Patient is a 42y old  Male who presents with a chief complaint of Palpitation and dizziness.      HPI:  42 yrs old M w/ pmhx of DM dx on 2021, presented with palpitation and dizziness. Pt reported that he taking a walk about 10 minutes were he felt palpitation [regular rhythm], with dizziness/lightheadedness, this was associated with blurry vision, pressure on the top of the head. Pt has been having 2 other episodes of dizziness in the past week, where he visited the ED once and was discharged on Meclizine.   Pt denied any chest pain, sweating, shortness of breath, abdominal pain, N/V/D/constipation, urinary symptoms, lower extremity swelling, weakness or decreased sensation in the arms or legs, runny nose, cough, ringing in the ears.   Pt noticed intermittent Rt ear pain, without any discharges. He denied any recent travel hx, hx of sick contacts. Pt had 2 days of cold/flu about 2 months ago.   Family hx is positive for DM in mother and father. Pt denied any family hx of CAD, HLD, stroke, sudden cardiac death.    (02 Jun 2023 19:24)      PAST MEDICAL & SURGICAL HISTORY:  DM (diabetes mellitus)          MEDICATIONS  (STANDING):  aspirin  chewable 81 milliGRAM(s) Oral daily  atorvastatin 10 milliGRAM(s) Oral at bedtime  enoxaparin Injectable 40 milliGRAM(s) SubCutaneous every 24 hours  insulin lispro (ADMELOG) corrective regimen sliding scale   SubCutaneous at bedtime  insulin lispro (ADMELOG) corrective regimen sliding scale   SubCutaneous three times a day before meals    MEDICATIONS  (PRN):  meclizine 25 milliGRAM(s) Oral three times a day PRN for dizziness      FAMILY HISTORY:  Family history of diabetes mellitus (DM) (Father, Mother)        SOCIAL HISTORY:    [ x] Non-smoker    [x ] Alcohol-denies    Allergies    No Known Allergies    Intolerances    	    REVIEW OF SYSTEMS:  CONSTITUTIONAL: No fever, weight loss, or fatigue  EYES: No eye pain, visual disturbances, or discharge  ENT:  No difficulty hearing, tinnitus, vertigo; No sinus or throat pain  NECK: No pain or stiffness  RESPIRATORY: No cough, wheezing, chills or hemoptysis; No Shortness of Breath  CARDIOVASCULAR: No chest pain,+ palpitations, passing out,+ dizziness  GASTROINTESTINAL: No abdominal or epigastric pain. No nausea, vomiting, or hematemesis; No diarrhea or constipation. No melena or hematochezia.  GENITOURINARY: No dysuria, frequency, hematuria, or incontinence  NEUROLOGICAL: No headaches, memory loss, loss of strength, numbness, or tremors  SKIN: No itching, burning, rashes, or lesions   LYMPH Nodes: No enlarged glands  ENDOCRINE: No heat or cold intolerance; No hair loss  MUSCULOSKELETAL: No joint pain or swelling; No muscle, back, or extremity pain  PSYCHIATRIC: No depression, anxiety, mood swings, or difficulty sleeping  HEME/LYMPH: No easy bruising, or bleeding gums  ALLERGY AND IMMUNOLOGIC: No hives or eczema	    PHYSICAL EXAM:  T(C): 36.1 (06-03-23 @ 07:10), Max: 37.1 (06-02-23 @ 14:57)  HR: 61 (06-03-23 @ 07:10) (61 - 106)  BP: 100/82 (06-03-23 @ 07:10) (100/82 - 144/81)  RR: 19 (06-03-23 @ 07:10) (13 - 20)  SpO2: 97% (06-03-23 @ 07:10) (95% - 100%)  Wt(kg): --  I&O's Summary    03 Jun 2023 07:01  -  03 Jun 2023 10:59  --------------------------------------------------------  IN: 118 mL / OUT: 0 mL / NET: 118 mL        Appearance: Normal	  HEENT:   Normal oral mucosa, PERRL, EOMI	  Lymphatic: No lymphadenopathy  Cardiovascular: Normal S1 S2, No JVD, No murmurs, No edema  Respiratory: Lungs clear to auscultation	  Psychiatry: A & O x 3, Mood & affect appropriate  Gastrointestinal:  Soft, Non-tender, + BS	  Skin: No rashes, No ecchymoses, No cyanosis	  Neurologic: Non-focal  Extremities: Normal range of motion, No clubbing, cyanosis or edema  Vascular: Peripheral pulses palpable 2+ bilaterally    ECG: nsr,rsr' v1 	    LABS:	 	    Troponin I, High Sensitivity Result: 4.5 ng/L (06-02-23 @ 16:11)                          13.5   7.26  )-----------( 197      ( 03 Jun 2023 06:25 )             40.4     06-03    138  |  110<H>  |  8   ----------------------------<  112<H>  3.8   |  25  |  0.50    Ca    9.4      03 Jun 2023 06:25  Phos  3.2     06-03  Mg     2.3     06-03    TPro  7.3  /  Alb  3.7  /  TBili  0.6  /  DBili  x   /  AST  15  /  ALT  47  /  AlkPhos  94  06-03    proBNP:   Lipid Profile: Cholesterol 126  LDL --  HDL 34    ldl calc 70  Ratio --    < from: CT Head No Cont (06.02.23 @ 18:39) >  ACC: [id number] EXAM:  CT BRAIN   ORDERED BY: WAYNE JOE     PROCEDURE DATE:  06/02/2023          INTERPRETATION:  CLINICAL INDICATION:  Dizziness. Headache.    TECHNIQUE: Noncontrast CT examination of the head was performed. Coronal   and sagittal  reformats were also obtained.    COMPARISON: There are no prior studies available for comparison.    FINDINGS:    There is no evidence of mass or acute intracranial hemorrhage. Ventricles   and sulci are normal in size and configuration for the patient's stated   age. No midline shift or other significant mass effect is noted. There is   no CT evidence of acute territorial infarct.    Mild mucosal thickening of the paranasal sinuses. The tympanomastoid   spaces are clear. Orbits and orbital contents are unremarkable.    There is no depressed calvarial fracture.        IMPRESSION:    No evidence of acute intracranial hemorrhage, midline shift or CT   evidence of acute territorial infarct.    If the patient's symptoms persist, consider short interval follow-up head   CT or brain MRI if there are no MRI contraindications.    --- End of Report ---            LOS MOON MD; Attending Radiologist  This document has been electronically signed. Jun 2 2023  7:03PM    < end of copied text >

## 2023-06-03 NOTE — PATIENT PROFILE ADULT - FALL HARM RISK - HARM RISK INTERVENTIONS

## 2023-06-03 NOTE — DISCHARGE NOTE PROVIDER - NSDCMRMEDTOKEN_GEN_ALL_CORE_FT
atorvastatin 10 mg oral tablet: 1 tab(s) orally once a day (at bedtime)  meclizine 25 mg oral tablet: 1 tab(s) orally 3 times a day as needed for  dizziness  metFORMIN 750 mg oral tablet, extended release: 1 tab(s) orally once a day   aspirin 81 mg oral tablet, chewable: 1 tab(s) orally once a day  atorvastatin 10 mg oral tablet: 1 tab(s) orally once a day (at bedtime)  meclizine 25 mg oral tablet: 1 tab(s) orally 3 times a day as needed for  dizziness  metFORMIN 750 mg oral tablet, extended release: 1 tab(s) orally once a day

## 2023-06-03 NOTE — DISCHARGE NOTE PROVIDER - HOSPITAL COURSE
===INCOMPLETE 6/3/23===  42 yrs old M w/ pmhx of DM dx on 2021, presented with palpitation and dizziness. Pt reported that he taking a walk about 10 minutes were he felt palpitation [regular rhythm], with dizziness/lightheadedness, this was associated with blurry vision, pressure on the top of the head. Pt has been having 2 other episodes of dizziness in the past week, where he visited the ED once and was discharged on Meclizine.     In the ED: Temp 98.7, HR 90, RR 19, /81, Pulseox 95% on RA. Admitted for ACS rule out.    Hospital Course by problem:    #Dizziness  Troponin neg x2  Orthostatic BPs were negative  CT head negative    #Palpitations  Echocardiogram showed ____    Please note this is only a summary, refer to the full chart for further details.     ===INCOMPLETE 6/3/23===  42 yrs old M w/ pmhx of DM dx on 2021, presented with palpitation and dizziness. Pt reported that he taking a walk about 10 minutes were he felt palpitation [regular rhythm], with dizziness/lightheadedness, this was associated with blurry vision, pressure on the top of the head. Pt has been having 2 other episodes of dizziness in the past week, where he visited the ED once and was discharged on Meclizine.     In the ED: Temp 98.7, HR 90, RR 19, /81, Pulseox 95% on RA. Admitted for ACS rule out.    Hospital Course by problem:    #Dizziness  Troponin neg x2  Orthostatic BPs were negative  CT head negative    #Palpitations  Pt complaining of palpitations on exertion. Telemetry showing some intermittent PVC  Echocardiogram showed ____  Pt endorses having follow up with cardiologist within 2 days of d/c    Given that pt is asymptomatic ni the hospital, and reassuring TTE findings, and that he has follow up with cardiology within 2 days of d/c, decision made to discharge. Given patient's improved clinical status and current hemodynamic stability, decision was made to discharge. Discussed with attending. Please refer to complete medical records for a full hospital course, as this is only a brief summary.Please note this is only a summary, refer to the full chart for further details.     ===INCOMPLETE 6/3/23===  42 yrs old M w/ pmhx of DM dx on 2021, presented with palpitation and dizziness. Pt reported that he taking a walk about 10 minutes were he felt palpitation [regular rhythm], with dizziness/lightheadedness, this was associated with blurry vision, pressure on the top of the head. Pt has been having 2 other episodes of dizziness in the past week, where he visited the ED once and was discharged on Meclizine.     In the ED: Temp 98.7, HR 90, RR 19, /81, Pulseox 95% on RA. Admitted for ACS rule out.    Hospital Course by problem:    #Dizziness  Troponin neg x2  Orthostatic BPs were negative  CT head negative    #Palpitations  Pt complaining of palpitations on exertion. Telemetry showing some intermittent PVC  Echocardiogram showed normal EF and minimal regurgitation  Pt endorses having follow up with cardiologist within 2 days of d/c    Given that pt is asymptomatic ni the hospital, and reassuring TTE findings, and that he has follow up with cardiology within 2 days of d/c, decision made to discharge. Given patient's improved clinical status and current hemodynamic stability, decision was made to discharge. Discussed with attending. Please refer to complete medical records for a full hospital course, as this is only a brief summary.Please note this is only a summary, refer to the full chart for further details.       42 yrs old M w/ pmhx of DM dx on 2021, presented with palpitation and dizziness. Pt reported that he taking a walk about 10 minutes were he felt palpitation [regular rhythm], with dizziness/lightheadedness, this was associated with blurry vision, pressure on the top of the head. Pt has been having 2 other episodes of dizziness in the past week, where he visited the ED once and was discharged on Meclizine.     In the ED: Temp 98.7, HR 90, RR 19, /81, Pulseox 95% on RA. Admitted for ACS rule out.    Hospital Course by problem:    #Dizziness  Troponin neg x2  Orthostatic BPs were negative  CT head negative    #Palpitations  Pt complaining of palpitations on exertion. Telemetry showing some intermittent PVC  Echocardiogram showed normal EF and minimal regurgitation  Pt endorses having follow up with cardiologist within 2 days of d/c    Given that pt is asymptomatic ni the hospital, and reassuring TTE findings, and that he has follow up with cardiology within 2 days of d/c, decision made to discharge. Given patient's improved clinical status and current hemodynamic stability, decision was made to discharge. Discussed with attending. Please refer to complete medical records for a full hospital course, as this is only a brief summary.Please note this is only a summary, refer to the full chart for further details.     Normal

## 2023-06-03 NOTE — PROGRESS NOTE ADULT - PROBLEM SELECTOR PLAN 1
As above a/w exertion and poor exercise tolerance  CT head unremarkable for acute intracranial etiology  EKG NSR and trop neg  TTE planned  Monitor on telemetry  Cardiology consulted  PT consult

## 2023-06-04 ENCOUNTER — TRANSCRIPTION ENCOUNTER (OUTPATIENT)
Age: 43
End: 2023-06-04

## 2023-06-04 VITALS
TEMPERATURE: 99 F | HEART RATE: 74 BPM | SYSTOLIC BLOOD PRESSURE: 112 MMHG | OXYGEN SATURATION: 96 % | RESPIRATION RATE: 18 BRPM | DIASTOLIC BLOOD PRESSURE: 77 MMHG

## 2023-06-04 LAB
ANION GAP SERPL CALC-SCNC: 3 MMOL/L — LOW (ref 5–17)
BUN SERPL-MCNC: 8 MG/DL — SIGNIFICANT CHANGE UP (ref 7–18)
CALCIUM SERPL-MCNC: 9.5 MG/DL — SIGNIFICANT CHANGE UP (ref 8.4–10.5)
CHLORIDE SERPL-SCNC: 107 MMOL/L — SIGNIFICANT CHANGE UP (ref 96–108)
CO2 SERPL-SCNC: 27 MMOL/L — SIGNIFICANT CHANGE UP (ref 22–31)
CREAT SERPL-MCNC: 0.54 MG/DL — SIGNIFICANT CHANGE UP (ref 0.5–1.3)
EGFR: 126 ML/MIN/1.73M2 — SIGNIFICANT CHANGE UP
GLUCOSE BLDC GLUCOMTR-MCNC: 115 MG/DL — HIGH (ref 70–99)
GLUCOSE BLDC GLUCOMTR-MCNC: 122 MG/DL — HIGH (ref 70–99)
GLUCOSE SERPL-MCNC: 112 MG/DL — HIGH (ref 70–99)
POTASSIUM SERPL-MCNC: 3.7 MMOL/L — SIGNIFICANT CHANGE UP (ref 3.5–5.3)
POTASSIUM SERPL-SCNC: 3.7 MMOL/L — SIGNIFICANT CHANGE UP (ref 3.5–5.3)
SODIUM SERPL-SCNC: 137 MMOL/L — SIGNIFICANT CHANGE UP (ref 135–145)
TSH SERPL-MCNC: 2.65 UU/ML — SIGNIFICANT CHANGE UP (ref 0.34–4.82)
VIT B12 SERPL-MCNC: 704 PG/ML — SIGNIFICANT CHANGE UP (ref 232–1245)

## 2023-06-04 PROCEDURE — 80048 BASIC METABOLIC PNL TOTAL CA: CPT

## 2023-06-04 PROCEDURE — 80061 LIPID PANEL: CPT

## 2023-06-04 PROCEDURE — 85027 COMPLETE CBC AUTOMATED: CPT

## 2023-06-04 PROCEDURE — 83735 ASSAY OF MAGNESIUM: CPT

## 2023-06-04 PROCEDURE — 99239 HOSP IP/OBS DSCHRG MGMT >30: CPT | Mod: GC

## 2023-06-04 PROCEDURE — 84100 ASSAY OF PHOSPHORUS: CPT

## 2023-06-04 PROCEDURE — 84484 ASSAY OF TROPONIN QUANT: CPT

## 2023-06-04 PROCEDURE — 70450 CT HEAD/BRAIN W/O DYE: CPT

## 2023-06-04 PROCEDURE — 83036 HEMOGLOBIN GLYCOSYLATED A1C: CPT

## 2023-06-04 PROCEDURE — 93306 TTE W/DOPPLER COMPLETE: CPT

## 2023-06-04 PROCEDURE — 83880 ASSAY OF NATRIURETIC PEPTIDE: CPT

## 2023-06-04 PROCEDURE — 80053 COMPREHEN METABOLIC PANEL: CPT

## 2023-06-04 PROCEDURE — 84443 ASSAY THYROID STIM HORMONE: CPT

## 2023-06-04 PROCEDURE — 93005 ELECTROCARDIOGRAM TRACING: CPT

## 2023-06-04 PROCEDURE — 85025 COMPLETE CBC W/AUTO DIFF WBC: CPT

## 2023-06-04 PROCEDURE — 99285 EMERGENCY DEPT VISIT HI MDM: CPT

## 2023-06-04 PROCEDURE — 82962 GLUCOSE BLOOD TEST: CPT

## 2023-06-04 PROCEDURE — 82607 VITAMIN B-12: CPT

## 2023-06-04 PROCEDURE — 71046 X-RAY EXAM CHEST 2 VIEWS: CPT

## 2023-06-04 PROCEDURE — 36415 COLL VENOUS BLD VENIPUNCTURE: CPT

## 2023-06-04 RX ORDER — ASPIRIN/CALCIUM CARB/MAGNESIUM 324 MG
1 TABLET ORAL
Qty: 30 | Refills: 0
Start: 2023-06-04 | End: 2023-07-03

## 2023-06-04 RX ADMIN — Medication 81 MILLIGRAM(S): at 11:56

## 2023-06-04 NOTE — PROGRESS NOTE ADULT - PROBLEM SELECTOR PLAN 3
Hold home Metformin  FS TID AC & insulin sliding scale
Hold home Metformin  FS TID AC & insulin sliding scale

## 2023-06-04 NOTE — DISCHARGE NOTE NURSING/CASE MANAGEMENT/SOCIAL WORK - PATIENT PORTAL LINK FT
You can access the FollowMyHealth Patient Portal offered by [facility] by registering at the following website: [email or web address]. By joining [employer]’s FollowMyHealth portal, you will also be able to view your health information using other applications (apps) compatible with our system.

## 2023-06-04 NOTE — PROGRESS NOTE ADULT - ASSESSMENT
42M with PMHx of T2DM presenting with one week history of palpitation and dizziness with exertion (in addition, poor exercise tolerance). Given acute presentation admitted for further work up.
42M with PMHx of T2DM presenting with one week history of palpitation and dizziness with exertion (in addition, poor exercise tolerance). Given acute presentation admitted for further work up.

## 2023-06-04 NOTE — PROGRESS NOTE ADULT - SUBJECTIVE AND OBJECTIVE BOX
PGY-1 Progress Note discussed with attending    PAGER #: [[phone]]  PLEASE CONTACT ON CALL TEAM:  - On Call Team (Please refer to [place]) FROM 5:00 PM - 8:30PM  - Nightfloat Team FROM 8:30 -7:30 AM      INTERVAL HPI/OVERNIGHT EVENTS:       REVIEW OF SYSTEMS:  CONSTITUTIONAL: Denies fever, weight loss, or fatigue  RESPIRATORY: Denies cough, wheezing, chills or hemoptysis; Denies shortness of breath  CARDIOVASCULAR: Denies chest pain, palpitations, dizziness, or leg swelling  GASTROINTESTINAL: Denies abdominal pain. Denies nausea, vomiting, or hematemesis; Denies diarrhea or constipation. Denies melena or hematochezia.  GENITOURINARY: Denies dysuria or hematuria, urinary frequency  NEUROLOGICAL: Denies headaches, memory loss, loss of strength, numbness, or tremors  SKIN: Denies itching, burning, rashes, or lesions     MEDICATIONS  (STANDING):  aspirin  chewable 81 milliGRAM(s) Oral daily  atorvastatin 10 milliGRAM(s) Oral at bedtime  enoxaparin Injectable 40 milliGRAM(s) SubCutaneous every 24 hours  insulin lispro (ADMELOG) corrective regimen sliding scale   SubCutaneous three times a day before meals  insulin lispro (ADMELOG) corrective regimen sliding scale   SubCutaneous at bedtime    MEDICATIONS  (PRN):  meclizine 25 milliGRAM(s) Oral three times a day PRN for dizziness      Vital Signs Last 24 Hrs  T(C): 36.3 (04 Jun 2023 07:05), Max: 37.3 (03 Jun 2023 15:17)  T(F): 97.3 (04 Jun 2023 07:05), Max: 99.2 (03 Jun 2023 15:17)  HR: 67 (04 Jun 2023 07:05) (67 - 79)  BP: 101/68 (04 Jun 2023 07:05) (101/68 - 112/82)  BP(mean): --  RR: 19 (04 Jun 2023 07:05) (18 - 19)  SpO2: 97% (04 Jun 2023 07:05) (96% - 98%)    Parameters below as of 04 Jun 2023 07:05  Patient On (Oxygen Delivery Method): room air        PHYSICAL EXAMINATION:  GENERAL: NAD, well built  HEAD:  Atraumatic, Normocephalic  EYES:  conjunctiva and sclera clear  NECK: Supple, No JVD  CHEST/LUNG: Clear to auscultation. No rales, rhonchi, wheezing, or rubs  HEART: Regular rate and rhythm  ABDOMEN: Soft, Nontender, Nondistended; Bowel sounds present, no pain or masses on palpation  NERVOUS SYSTEM:  Alert & Oriented X3  PSYCH: appropriate affect  : voiding well  EXTREMITIES:  2+ Peripheral Pulses, No clubbing, cyanosis, or edema  SKIN: warm dry                          13.5   7.26  )-----------( 197      ( 03 Jun 2023 06:25 )             40.4     06-04    137  |  107  |  8   ----------------------------<  112<H>  3.7   |  27  |  0.54    Ca    9.5      04 Jun 2023 07:00  Phos  3.2     06-03  Mg     2.3     06-03    TPro  7.3  /  Alb  3.7  /  TBili  0.6  /  DBili  x   /  AST  15  /  ALT  47  /  AlkPhos  94  06-03    LIVER FUNCTIONS - ( 03 Jun 2023 06:25 )  Alb: 3.7 g/dL / Pro: 7.3 g/dL / ALK PHOS: 94 U/L / ALT: 47 U/L DA / AST: 15 U/L / GGT: x                   I&O's Summary    03 Jun 2023 07:01  -  04 Jun 2023 07:00  --------------------------------------------------------  IN: 526 mL / OUT: 0 mL / NET: 526 mL    04 Jun 2023 07:01  -  04 Jun 2023 09:43  --------------------------------------------------------  IN: 118 mL / OUT: 0 mL / NET: 118 mL            CAPILLARY BLOOD GLUCOSE      RADIOLOGY & ADDITIONAL TESTS:

## 2023-06-04 NOTE — CHART NOTE - NSCHARTNOTEFT_GEN_A_CORE
Discharge Day:    Patient seen and examined at bedside. No acute events overnight. Feeling okay. Dizziness and palpitation w/ exertion has resolved. He will attempt to walk around unit further. On exam VSS, heart and lungs unremarkable. Well appearing, non-focal.    TTE reviewed and grossly seems unremarkable.    He states he will follow up with his OP cardiologist (Kareem? Jacky Miranda on [address]?).    NO changes to diabetes or hyperlipidemia medication. Can start baby aspirin for primary prevention.    Discharge Time: 35 minutes

## 2024-03-29 NOTE — ED PROVIDER NOTE - HIV OFFER
Thank you for visiting the Emergency Department today.    Area of inflammation in the large intestine which may represent diverticulitis.  You also have a new elevation in your white blood cell count and bacteria in your urine therefore we have decided to treat with antibiotics.  Take ciprofloxacin and metronidazole (Flagyl) as directed x 5 days.  Return here if symptoms worsen.  Avoid alcohol.  Contact your PCP for follow-up visit.  Consider follow-up with your prescribing provider to discuss whether a GLP-1 medication such as semaglutide is right for you due to ongoing symptoms which may be related.   Previously Declined (within the last year)

## 2025-07-07 ENCOUNTER — NON-APPOINTMENT (OUTPATIENT)
Age: 45
End: 2025-07-07

## 2025-08-01 NOTE — PATIENT PROFILE ADULT - NSTRANSFERBELONGINGSRESP_GEN_A_NUR
Orders:    CBC with Auto Differential; Future    Comprehensive Metabolic Panel; Future  bp 140/90   yes